# Patient Record
Sex: MALE | Race: WHITE | NOT HISPANIC OR LATINO | Employment: FULL TIME | URBAN - METROPOLITAN AREA
[De-identification: names, ages, dates, MRNs, and addresses within clinical notes are randomized per-mention and may not be internally consistent; named-entity substitution may affect disease eponyms.]

---

## 2020-09-29 ENCOUNTER — OCCUPATIONAL HEALTH (OUTPATIENT)
Dept: URGENT CARE | Facility: CLINIC | Age: 50
End: 2020-09-29

## 2020-09-29 VITALS
WEIGHT: 180 LBS | RESPIRATION RATE: 16 BRPM | OXYGEN SATURATION: 99 % | HEIGHT: 75 IN | SYSTOLIC BLOOD PRESSURE: 140 MMHG | DIASTOLIC BLOOD PRESSURE: 90 MMHG | BODY MASS INDEX: 22.38 KG/M2 | HEART RATE: 86 BPM | TEMPERATURE: 97 F

## 2020-09-29 DIAGNOSIS — Z00.00 PHYSICAL EXAM: Primary | ICD-10-CM

## 2020-09-29 PROCEDURE — 99199 UNLISTED SPECIAL SVC PX/RPRT: CPT | Mod: S$GLB,,,

## 2020-09-29 PROCEDURE — 99455 PR DISABILITY EXAM/TREATING DR: ICD-10-PCS | Mod: S$GLB,,,

## 2020-09-29 PROCEDURE — 99455 WORK RELATED DISABILITY EXAM: CPT | Mod: S$GLB,,,

## 2020-09-29 PROCEDURE — 99199 RECORD REVIEW 1ST 200 PAGES: ICD-10-PCS | Mod: S$GLB,,,

## 2020-09-29 NOTE — PROGRESS NOTES
Subjective:       Patient ID: Rigo Almanzar is a 49 y.o. male.    Chief Complaint: No chief complaint on file.    Pt returned for a follow up visit for RT HIP AND GROIN PAIN. Pt states his injury has improved a little since his last visit. His pain today is a 4/10 and he have been taking Ibuprofen 800 for the pain as needed. IJ    Hip Pain   The incident occurred at work. The injury mechanism was a direct blow. The pain is present in the right hip. The quality of the pain is described as aching. The pain is at a severity of 4/10. The pain is mild. The pain has been fluctuating since onset. Treatments tried: IBUPROFEN. The treatment provided mild relief.       Constitution: Negative for chills, fatigue and fever.   HENT: Negative for congestion and sore throat.    Neck: Negative for painful lymph nodes.   Cardiovascular: Negative for chest pain and leg swelling.   Eyes: Negative for double vision and blurred vision.   Respiratory: Negative for cough and shortness of breath.    Gastrointestinal: Negative for nausea, vomiting and diarrhea.   Genitourinary: Negative for dysuria, frequency and urgency.   Musculoskeletal: Positive for pain and muscle ache. Negative for joint pain, joint swelling and muscle cramps.   Skin: Negative for color change, pale and rash.   Allergic/Immunologic: Negative for seasonal allergies.   Neurological: Negative for dizziness, history of vertigo, light-headedness, passing out and headaches.   Hematologic/Lymphatic: Negative for swollen lymph nodes, easy bruising/bleeding and history of blood clots. Does not bruise/bleed easily.   Psychiatric/Behavioral: Negative for nervous/anxious, sleep disturbance and depression. The patient is not nervous/anxious.         Objective:      Physical Exam    Assessment:       No diagnosis found.    Plan:                   No follow-ups on file.